# Patient Record
(demographics unavailable — no encounter records)

---

## 2019-08-01 NOTE — XRAY REPORT
Left humerus 2 views



Indication:

Pain.



Findings:

No fracture or dislocation. An oval mixed density lesion of the proximal humeral metaphysis has a scl
erotic margin. No other bone lesions. Normal soft tissues. Normal alignment at the shoulder and elbow
.



IMPRESSION: No acute finding. A probably benign lesion of the proximal humerus. Recommend nuclear med
icine bone scan.



Signer Name: Wale Bray MD 

Signed: 8/1/2019 1:16 PM

 Workstation Name: [id number]

## 2019-08-01 NOTE — CAT SCAN REPORT
CT ABDOMEN AND PELVIS WITHOUT CONTRAST 



HISTORY: anemia possible fall



COMPARISON: None



TECHNIQUE: Images of the abdomen and pelvis were obtained without IV contrast. Oral contrast was not 
given.



This exam is limited without IV contrast and is tailored for evaluation of renal calculi. Renal merari
s and other sources of hematuria and abdominal pain may not be visible on this exam. 



CONTRAST: None.



FINDINGS:



CT ABDOMEN:

Lung Bases: Clear lungs. A 5 cm thoracoabdominal aneurysm with chronic dissection extending to the ri
ght common iliac artery.

Liver: Small with no mass.

Biliary: No significant abnormality. Normal gallbladder and bile ducts.

Spleen: No significant abnormality.  Unenlarged.

Pancreas: No significant abnormality.

Adrenals: No significant abnormality.



Right Kidney: Small benign cysts. The largest is in the midportion of the kidney and measures 2 cm. A
 4 mm upper pole exophytic hyperdense cyst. No hydronephrosis.

Left Kidney: Small benign cysts. A couple of tiny hyperdense exophytic cysts. No hydronephrosis.



Lymphatics: No lymphadenopathy.

Vasculature: Chronic aortic dissection. The infra renal aorta measures 2.9 cm in diameter. An infrare
nal abdominal aortic aneurysm measures 4 cm diameter and begins at the origin of the iliac arteries. 


Bowel/Peritoneum: No significant abnormality. No free air. No free fluid. Appendix not visualized. No
 pericecal inflammation.



CT PELVIS:

: Urinary bladder is distended with no mass or calculus. No bladder wall thickening. Normal seminal
 vesicles and prostate.

GI: Normal sigmoid colon. Nonspecific circumferential wall thickening at the anus. It measures 12 mm 
in maximum thickness.



Osseous Structures: No significant abnormality.

Additional Findings: None



IMPRESSION:

1. Chronic aortic dissection and chronic thoracoabdominal aortic aneurysm with no signs of rupture.

2. Benign renal cysts.

3. Nonspecific anal wall thickening.



Signer Name: Wale Bray MD 

Signed: 8/1/2019 1:30 PM

 Workstation Name: [id number]

## 2019-08-01 NOTE — CAT SCAN REPORT
CT HEAD WITHOUT CONTRAST



INDICATION : Fall, head injury.



TECHNIQUE:  Axial imaging performed from the skull apex through the skull base without the use of con
trast.  All CT scans at this location are performed using CT dose reduction for ALARA by means of aut
omated exposure control. 



COMPARISON:  None



FINDINGS:  



Parenchyma:  No acute intracranial hemorrhage or parenchymal abnormality. Mild diffuse cortical volum
e loss is noted. Tiny chronic lacunar infarcts in both thalami measure up to 5 mm. No large chronic i
nfarct.

Ventricles:  Ventricles are normal in size and appear symmetric.   

Bones:  No acute osseous abnormality.   

Sinuses:  Sinuses and mastoid air cells are clear.



Soft tissues:  Soft tissues including the orbits appear normal.   



IMPRESSION: No acute abnormality. Age appropriate volume loss. Chronic lacunar infarcts in both thala
mi.



Signer Name: Miguel Estrada Jr, MD 

Signed: 8/1/2019 12:13 PM

 Workstation Name: [id number]

## 2019-08-01 NOTE — CAT SCAN REPORT
CT CHEST WITHOUT CONTRAST



INDICATION / CLINICAL INFORMATION:  Chest pain for 3 days.



TECHNIQUE:

Axial CT images were obtained through the chest without contrast. Sagittal and coronal reformatted im
ages. All CT scans at this location are performed using CT dose reduction for ALARA by means of autom
ated exposure control. 



COMPARISON: 3/30/2013 CTA chest



FINDINGS:



HEART: Mild cardiomegaly appears stable. Small pericardial effusion has resolved since the previous e
xam. 

THORACIC AORTA: This is not a contrast exam although previously described thoracic aortic dissection 
appears relatively stable since 2013. Interval surgical changes in the ascending aorta are suspected.


MEDIASTINUM and PARVIZ: No significant abnormality.

LUNGS: No acute air space or interstitial disease.

PLEURA: No significant pleural effusion. No pneumothorax.

SKELETAL SYSTEM: No displaced thoracic fracture is identified. The bones are mildly demineralized



UPPER ABDOMEN: No significant abnormality.



ADDITIONAL FINDINGS: Soft tissue edema or contusion is noted in the left shoulder region.



IMPRESSION:

 No acute cardiopulmonary process.

Thoracic aortic dissection which is grossly unchanged 2013.

No thoracic fracture is visualized. Left chest wall contusion is suspected.



Signer Name: Miguel Estrada Jr, MD 

Signed: 8/1/2019 12:18 PM

 Workstation Name: [id number]

## 2019-08-01 NOTE — EMERGENCY DEPARTMENT REPORT
ED General Adult HPI





- General


Chief complaint: Shoulder Injury


Stated complaint: PAIN LFT UNDERARM


Time Seen by Provider: 08/01/19 10:31


Source: patient


Mode of arrival: Ambulatory


Limitations: Language Barrier





- History of Present Illness


Initial comments: 





Mr. Chance is a 78-year-old Hungarian speaking male who presents with family 

member who provided Language interpretation according to patient's wishes. 





He presents with pain bruising at the left chest, arm shoulder.  No history of 

fall or trauma. Takes aspirin but no other anticoagulants according to 

medication reviewed at the bedside.





Gradual onset of pain several days ago.  Mild pain with movement.  No other 

associated injuries.  Constant pain.


-: Gradual, days(s) (2)


Location: chest, left, upper extremity


Consistency: constant


Improves with: none


Worsens with: movement


Associated Symptoms: rash





- Related Data


                                Home Medications











 Medication  Instructions  Recorded  Confirmed  Last Taken


 


Aspirin [Aspirin BABY CHEW TAB] 81 mg PO QDAY 01/17/15 01/17/15 01/16/15


 


Carvedilol [Coreg] 25 mg PO BID 01/17/15 01/17/15 01/16/15


 


amLODIPine [Norvasc] 5 mg PO DAILY 01/17/15 01/17/15 01/16/15


 


cloNIDine [Catapres] 0.1 mg PO BID 01/17/15 01/17/15 01/16/15








                                  Previous Rx's











 Medication  Instructions  Recorded  Last Taken  Type


 


Pseudoephedrine [Sudafed] 30 mg PO Q6HR PRN #15 tablet 01/17/15 Unknown Rx


 


guaiFENesin 400 mg PO TID #15 tablet 01/17/15 Unknown Rx











                                    Allergies











Allergy/AdvReac Type Severity Reaction Status Date / Time


 


No Known Allergies Allergy   Unverified 01/17/15 01:13














ED Review of Systems


ROS: 


Stated complaint: PAIN LFT UNDERARM


Other details as noted in HPI





Comment: All other systems reviewed and negative


Constitutional: denies: fever, malaise


Skin: rash, lesions, change in color





ED Past Medical Hx





- Past Medical History


Previous Medical History?: Yes


Hx Hypertension: Yes


Additional medical history: AAA





- Surgical History


Additional Surgical History: AAA repair





- Social History


Smoking Status: Never Smoker


Substance Use Type: None





- Medications


Home Medications: 


                                Home Medications











 Medication  Instructions  Recorded  Confirmed  Last Taken  Type


 


Aspirin [Aspirin BABY CHEW TAB] 81 mg PO QDAY 01/17/15 01/17/15 01/16/15 History


 


Carvedilol [Coreg] 25 mg PO BID 01/17/15 01/17/15 01/16/15 History


 


Pseudoephedrine [Sudafed] 30 mg PO Q6HR PRN #15 tablet 01/17/15  Unknown Rx


 


amLODIPine [Norvasc] 5 mg PO DAILY 01/17/15 01/17/15 01/16/15 History


 


cloNIDine [Catapres] 0.1 mg PO BID 01/17/15 01/17/15 01/16/15 History


 


guaiFENesin 400 mg PO TID #15 tablet 01/17/15  Unknown Rx














ED Physical Exam





- General


Limitations: Language Barrier


General appearance: alert, in no apparent distress





- Head


Head exam: Present: atraumatic, normocephalic





- Eye


Eye exam: Present: normal appearance





- ENT


ENT exam: Present: mucous membranes moist





- Neck


Neck exam: Present: normal inspection, full ROM





- Respiratory


Respiratory exam: Present: normal lung sounds bilaterally.  Absent: respiratory 

distress, wheezes, rales, rhonchi





- Cardiovascular


Cardiovascular Exam: Present: regular rate, normal rhythm, normal heart sounds. 

 Absent: systolic murmur, diastolic murmur, rubs, gallop





- GI/Abdominal


GI/Abdominal exam: Present: soft, normal bowel sounds.  Absent: distended, 

guarding, rebound





- Rectal


Rectal exam: Present: normal inspection, normal rectal tone, heme (-) stool, 

other (Brown stool Hemoccult negative)





- Extremities Exam


Extremities exam: Present: normal inspection





- Back Exam


Back exam: Present: normal inspection





- Neurological Exam


Neurological exam: Present: alert, oriented X3





- Psychiatric


Psychiatric exam: Present: normal affect, normal mood





- Skin


Skin exam: Present: warm, dry, intact.  Absent: rash





- Other


Other exam information: 





Extensive bruising ecchymoses left mid axillary region chest into the lower 

flank, bruising in the medial region of the left arm.





Full range of motion in both shoulders.  There is a surgical scar overlying the 

right shoulder.  No obvious deformity in any extremity.  No point tenderness.





ED Course


                                   Vital Signs











  08/01/19 08/01/19





  09:47 10:45


 


Temperature 97.7 F 


 


Pulse Rate 72 72


 


Respiratory 20 





Rate  


 


Blood Pressure 190/104 190/104


 


O2 Sat by Pulse 100 





Oximetry  














ED Medical Decision Making





- Lab Data


Result diagrams: 


                                 08/01/19 11:07





                                 08/01/19 11:07





- Medical Decision Making





Mr. Chance has extensive bruising of the left arm left flank.  Concern for fall. 

 Concern for trauma.  Considering patient is elderly.  CT head and chest were 

obtained.  The bruising appeared old.  





Severe hypertension noted in the ED, patient was given his home dose of 

clonidine





I reviewed labs notable for severe anemia which is changed from 2015.  GFR 

slightly decreased with elevated BUN.  INR elevated at 1.3.  With these findings

 concerning for malignancy, chronic GI blood loss, trauma, anemia of chronic 

illness such as kidney disease.  CKD has progressed since 2015.  GFR 34.  

Patient appears well, energetic.  He is walking briskly.  Suspect subacute 

process which would account for these findings.  He is appropriate for 

discharge.





CT scan abdomen and pelvis chest revealed chronic aortic dissection would aortic

 aneurysm no signs of rupture.  Benign renal cysts.





Humerus shows sclerotic lesion possibly benign





Referred to nephrologist.  It appears that he had seen Dr. Ramos the past 

according to electronic medical record.  Also referred to gastroenterologist.


Critical care attestation.: 


If time is entered above; I have spent that time in minutes in the direct care 

of this critically ill patient, excluding procedure time.








ED Disposition


Clinical Impression: 


 CKD (chronic kidney disease), Chronic dissection of thoracic aorta, Anemia, 

Bruising, Flank pain, Hypertensive urgency





Disposition: DC-01 TO HOME OR SELFCARE


Is pt being admited?: No


Does the pt Need Aspirin: No


Condition: Stable


Additional Instructions: 


You will need to see your primary doctor, kidney doctor and stomach doctor.  


Referrals: 


CHULA RAMOS RN [Registered Nurse] - 3-5 Days


ERNST RODRIGUEZ MD [Staff Physician] - 3-5 Days